# Patient Record
Sex: FEMALE | Race: OTHER | URBAN - NONMETROPOLITAN AREA
[De-identification: names, ages, dates, MRNs, and addresses within clinical notes are randomized per-mention and may not be internally consistent; named-entity substitution may affect disease eponyms.]

---

## 2022-04-15 ENCOUNTER — NEW PATIENT (OUTPATIENT)
Dept: URBAN - NONMETROPOLITAN AREA CLINIC 1 | Facility: CLINIC | Age: 62
End: 2022-04-15

## 2022-04-15 DIAGNOSIS — H52.4: ICD-10-CM

## 2022-04-15 PROCEDURE — 92015 DETERMINE REFRACTIVE STATE: CPT

## 2022-04-15 PROCEDURE — 92004 COMPRE OPH EXAM NEW PT 1/>: CPT

## 2022-04-15 ASSESSMENT — KERATOMETRY
OS_K2POWER_DIOPTERS: 75
OD_AXISANGLE_DEGREES: 44.50
OD_K2POWER_DIOPTERS: 75
OD_AXISANGLE2_DEGREES: 134
OS_AXISANGLE2_DEGREES: 134
OD_K1POWER_DIOPTERS: 44.00
OS_AXISANGLE_DEGREES: 44.50
OS_K1POWER_DIOPTERS: 44.00

## 2022-04-15 ASSESSMENT — VISUAL ACUITY
OD_SC: 20/20-2
OS_SC: 20/25+

## 2022-04-15 ASSESSMENT — TONOMETRY
OD_IOP_MMHG: 14
OS_IOP_MMHG: 14

## 2022-04-15 NOTE — PATIENT DISCUSSION
Discussed diagnosis in detail with patient. Reviewed symptoms related to cataract progression. Recommend refer to Dr. Kyle Lott for cataract evaluation. Patient elects to schedule.

## 2022-06-01 ENCOUNTER — CONSULTATION/EVALUATION (OUTPATIENT)
Dept: URBAN - NONMETROPOLITAN AREA CLINIC 1 | Facility: CLINIC | Age: 62
End: 2022-06-01

## 2022-06-01 DIAGNOSIS — H25.13: ICD-10-CM

## 2022-06-01 PROCEDURE — 99214 OFFICE O/P EST MOD 30 MIN: CPT

## 2022-06-01 ASSESSMENT — VISUAL ACUITY
OD_PAM: 20/20
OD_SC: 20/200
OS_BAT: 20/50
OS_CC: 20/20
OD_BAT: 20/50
OS_SC: 20/200
OD_CC: 20/20
OS_AM: 20/20

## 2022-06-01 ASSESSMENT — TONOMETRY
OD_IOP_MMHG: 12
OS_IOP_MMHG: 12

## 2022-06-01 ASSESSMENT — KERATOMETRY
OS_AXISANGLE2_DEGREES: 134
OD_K2POWER_DIOPTERS: 75
OS_AXISANGLE_DEGREES: 44.50
OD_K1POWER_DIOPTERS: 44.00
OD_AXISANGLE2_DEGREES: 134
OD_AXISANGLE_DEGREES: 44.50
OS_K2POWER_DIOPTERS: 75
OS_K1POWER_DIOPTERS: 44.00

## 2022-06-01 NOTE — PATIENT DISCUSSION
(Surgical) Visually Significant (secondary to glare), discussed the risks, benefits, alternatives, and limitations of cataract surgery. The patient stated a full understanding and a desire to proceed with the procedure. The patient will need to return for pre-op appointment with cataract measurements and to have any additional questions answered, and start pre-operative eye drops as directed. Pt elects to proceed with CE OD first and then CE OS after. Qualifies for LRI OU & discussed lens & discussed lens benefits with the need for reading glasses. Discussed LenSx vs Trad sx. Discussed Stand/Trad OU & need for bifocals s/p surgery. Dextenza OU + Start AT and Lotemax TID OU x 1 Week & RTC for Repeat Angeline Ou.

## 2022-06-21 ENCOUNTER — PRE-OP/H&P (OUTPATIENT)
Dept: URBAN - NONMETROPOLITAN AREA CLINIC 1 | Facility: CLINIC | Age: 62
End: 2022-06-21

## 2022-06-21 VITALS
DIASTOLIC BLOOD PRESSURE: 69 MMHG | HEART RATE: 74 BPM | BODY MASS INDEX: 20.09 KG/M2 | HEIGHT: 66 IN | SYSTOLIC BLOOD PRESSURE: 99 MMHG | WEIGHT: 125 LBS

## 2022-06-21 DIAGNOSIS — H25.13: ICD-10-CM

## 2022-06-21 DIAGNOSIS — Z01.818: ICD-10-CM

## 2022-06-21 PROCEDURE — 99499 UNLISTED E&M SERVICE: CPT

## 2022-06-21 PROCEDURE — 92134 CPTRZ OPH DX IMG PST SGM RTA: CPT

## 2022-06-21 ASSESSMENT — KERATOMETRY
OS_K1POWER_DIOPTERS: 44.00
OD_K2POWER_DIOPTERS: 75
OD_AXISANGLE_DEGREES: 44.50
OD_K1POWER_DIOPTERS: 44.00
OS_AXISANGLE_DEGREES: 44.50
OS_AXISANGLE2_DEGREES: 134
OS_K2POWER_DIOPTERS: 75
OD_AXISANGLE2_DEGREES: 134

## 2022-06-21 ASSESSMENT — VISUAL ACUITY
OD_PAM: 20/20
OD_CC: 20/20
OS_AM: 20/20
OS_CC: 20/20
OD_BAT: 20/50
OD_SC: 20/200
OS_SC: 20/200
OS_BAT: 20/50

## 2022-06-21 NOTE — PATIENT DISCUSSION
(Observe) Observe for now without intervention. The patient was advised to contact office with any change or worsening of vision. *Long discussion had with patient today. Pt states her vision is stable in her point of view at this time. Recommend hold off on surgery for now and follow up in 18 months. Have her see Curtis n/a for refraction with contacts*.

## 2022-07-29 ENCOUNTER — FOLLOW UP (OUTPATIENT)
Dept: URBAN - NONMETROPOLITAN AREA CLINIC 1 | Facility: CLINIC | Age: 62
End: 2022-07-29

## 2022-07-29 DIAGNOSIS — H52.4: ICD-10-CM

## 2022-07-29 PROCEDURE — 92310-N CONTACT LENS FITTING NEW PATIENT

## 2022-07-29 ASSESSMENT — KERATOMETRY
OD_K2POWER_DIOPTERS: 75
OS_AXISANGLE_DEGREES: 44.50
OS_K2POWER_DIOPTERS: 75
OS_AXISANGLE2_DEGREES: 134
OD_K1POWER_DIOPTERS: 44.00
OS_K1POWER_DIOPTERS: 44.00
OD_AXISANGLE_DEGREES: 44.50
OD_AXISANGLE2_DEGREES: 134

## 2022-07-29 NOTE — PATIENT DISCUSSION
(Observe) Observe for now without intervention. The patient was advised to contact office with any change or worsening of vision. *Long discussion had with patient today. Pt states her vision is stable in her point of view at this time. Recommend hold off on surgery for now.

## 2023-05-01 ENCOUNTER — ESTABLISHED PATIENT (OUTPATIENT)
Dept: URBAN - NONMETROPOLITAN AREA CLINIC 1 | Facility: CLINIC | Age: 63
End: 2023-05-01

## 2023-05-01 DIAGNOSIS — H52.4: ICD-10-CM

## 2023-05-01 PROCEDURE — 92310-E CONTACT LENS FITTING ESTABLISH PATIENT

## 2023-05-01 PROCEDURE — 92015 DETERMINE REFRACTIVE STATE: CPT

## 2023-05-01 PROCEDURE — 92014 COMPRE OPH EXAM EST PT 1/>: CPT

## 2023-05-01 ASSESSMENT — VISUAL ACUITY
OD_BAT: 20/60
OS_BAT: 20/40
OU_CC: J1+
OS_CC: 20/20-2 BLURRY

## 2023-05-01 ASSESSMENT — TONOMETRY
OS_IOP_MMHG: 13
OD_IOP_MMHG: 12

## 2024-05-06 ENCOUNTER — ESTABLISHED PATIENT (OUTPATIENT)
Dept: URBAN - NONMETROPOLITAN AREA CLINIC 1 | Facility: CLINIC | Age: 64
End: 2024-05-06

## 2024-05-06 DIAGNOSIS — H52.223: ICD-10-CM

## 2024-05-06 DIAGNOSIS — H52.4: ICD-10-CM

## 2024-05-06 DIAGNOSIS — H52.13: ICD-10-CM

## 2024-05-06 PROCEDURE — 92310-E CONTACT LENS FITTING ESTABLISH PATIENT

## 2024-05-06 PROCEDURE — 92015 DETERMINE REFRACTIVE STATE: CPT

## 2024-05-06 PROCEDURE — 92014 COMPRE OPH EXAM EST PT 1/>: CPT

## 2024-05-06 ASSESSMENT — VISUAL ACUITY
OD_CC: 20/20
OU_CC: 20/20
OS_CC: 20/22

## 2024-05-06 ASSESSMENT — TONOMETRY
OS_IOP_MMHG: 16
OD_IOP_MMHG: 16